# Patient Record
Sex: FEMALE | Race: WHITE | NOT HISPANIC OR LATINO | Employment: FULL TIME | URBAN - METROPOLITAN AREA
[De-identification: names, ages, dates, MRNs, and addresses within clinical notes are randomized per-mention and may not be internally consistent; named-entity substitution may affect disease eponyms.]

---

## 2017-05-26 ENCOUNTER — APPOINTMENT (OUTPATIENT)
Dept: WOMENS IMAGING | Facility: HOSPITAL | Age: 47
End: 2017-05-26

## 2017-05-26 PROCEDURE — 77063 BREAST TOMOSYNTHESIS BI: CPT | Performed by: RADIOLOGY

## 2017-05-26 PROCEDURE — 77067 SCR MAMMO BI INCL CAD: CPT | Performed by: RADIOLOGY

## 2017-06-02 ENCOUNTER — TRANSCRIBE ORDERS (OUTPATIENT)
Dept: ADMINISTRATIVE | Facility: HOSPITAL | Age: 47
End: 2017-06-02

## 2017-06-02 DIAGNOSIS — E66.9 OBESITY, UNSPECIFIED OBESITY SEVERITY, UNSPECIFIED OBESITY TYPE: Primary | ICD-10-CM

## 2017-06-02 DIAGNOSIS — E28.2 PCOS (POLYCYSTIC OVARIAN SYNDROME): ICD-10-CM

## 2018-06-07 ENCOUNTER — APPOINTMENT (OUTPATIENT)
Dept: WOMENS IMAGING | Facility: HOSPITAL | Age: 48
End: 2018-06-07

## 2018-06-07 PROCEDURE — 77067 SCR MAMMO BI INCL CAD: CPT | Performed by: RADIOLOGY

## 2018-06-07 PROCEDURE — 77063 BREAST TOMOSYNTHESIS BI: CPT | Performed by: RADIOLOGY

## 2018-06-08 ENCOUNTER — OFFICE VISIT (OUTPATIENT)
Dept: GASTROENTEROLOGY | Facility: CLINIC | Age: 48
End: 2018-06-08

## 2018-06-08 VITALS
DIASTOLIC BLOOD PRESSURE: 86 MMHG | TEMPERATURE: 98.7 F | HEIGHT: 67 IN | BODY MASS INDEX: 45.99 KG/M2 | SYSTOLIC BLOOD PRESSURE: 128 MMHG | WEIGHT: 293 LBS

## 2018-06-08 DIAGNOSIS — R13.14 PHARYNGOESOPHAGEAL DYSPHAGIA: Primary | ICD-10-CM

## 2018-06-08 DIAGNOSIS — E66.01 MORBID OBESITY WITH BMI OF 50.0-59.9, ADULT (HCC): ICD-10-CM

## 2018-06-08 PROCEDURE — 99204 OFFICE O/P NEW MOD 45 MIN: CPT | Performed by: INTERNAL MEDICINE

## 2018-06-08 RX ORDER — DICLOFENAC SODIUM AND MISOPROSTOL 50; 200 MG/1; UG/1
TABLET, DELAYED RELEASE ORAL
COMMUNITY

## 2018-06-08 RX ORDER — FUROSEMIDE 40 MG/1
TABLET ORAL
COMMUNITY
Start: 2018-05-30

## 2018-06-08 RX ORDER — LORATADINE 10 MG/1
10 TABLET ORAL
COMMUNITY

## 2018-06-08 NOTE — PROGRESS NOTES
Chief Complaint   Patient presents with   • Difficulty Swallowing       Subjective     HPI    Philippe Claros is a 47 y.o. female with a past medical history noted below who presents for evaluation of difficulty swallowing.  First episode occurred about 2 weeks ago.  She was eating salad and felt it get stuck at the left side of her throat.  Stuck for a few seconds.  She then coughed it up because she felt that she was going to choke.  This has persisted and she has felt daily episodes since then, despite chewing her food really well. Doing ok with liquids, soft foods.  Denies any associated heartburn, reflux, nausea, abdominal pain.  Has not had similar issues in the past.  Not on any ppi, antacid.      No smoking, social ETOH.  No family history of GI malignancy, no throat cancers.  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  No abdominal surgeries.    Has knee osteoarthritis and has gained weight due to symptoms.    Past Medical History:   Diagnosis Date   • Anxiety    • Osteoarthritis          Current Outpatient Prescriptions:   •  diclofenac-misoprostol (ARTHROTEC 50) 50-0.2 MG EC tablet, Take  by mouth., Disp: , Rfl:   •  furosemide (LASIX) 40 MG tablet, , Disp: , Rfl:   •  loratadine (CLARITIN) 10 MG tablet, Take 10 mg by mouth., Disp: , Rfl:   •  progesterone (PROMETRIUM) 100 MG capsule, Take 100 mg by mouth., Disp: , Rfl:     Allergies   Allergen Reactions   • Naproxen GI Intolerance   • Latex Rash       Social History     Social History   • Marital status: Single     Spouse name: N/A   • Number of children: N/A   • Years of education: N/A     Occupational History   • Not on file.     Social History Main Topics   • Smoking status: Former Smoker   • Smokeless tobacco: Never Used   • Alcohol use Yes      Comment: occ    • Drug use: No   • Sexual activity: Not on file     Other Topics Concern   • Not on file     Social History Narrative   • No narrative on file       History reviewed. No pertinent family  history.    Review of Systems   Constitutional: Negative for activity change, appetite change and fatigue.   HENT: Positive for trouble swallowing. Negative for sore throat.    Respiratory: Negative.    Cardiovascular: Negative.    Gastrointestinal: Negative for abdominal distention, abdominal pain and blood in stool.   Endocrine: Negative for cold intolerance and heat intolerance.   Genitourinary: Negative for difficulty urinating, dysuria and frequency.   Musculoskeletal: Positive for arthralgias. Negative for back pain and myalgias.   Skin: Negative.    Hematological: Negative for adenopathy. Does not bruise/bleed easily.   All other systems reviewed and are negative.      Objective     Vitals:    06/08/18 1425   BP: 128/86   Temp: 98.7 °F (37.1 °C)     1    06/08/18  1425   Weight: (!) 149 kg (327 lb 9.6 oz)     Body mass index is 51.31 kg/m².    Physical Exam   Constitutional: She is oriented to person, place, and time. She appears well-developed and well-nourished. No distress.   obese   HENT:   Head: Normocephalic and atraumatic.   Right Ear: External ear normal.   Left Ear: External ear normal.   Nose: Nose normal.   Mouth/Throat: Oropharynx is clear and moist.   Eyes: Conjunctivae and EOM are normal. Right eye exhibits no discharge. Left eye exhibits no discharge. No scleral icterus.   Neck: Normal range of motion. Neck supple. No tracheal deviation present. No thyromegaly present.   No supraclavicular adenopathy   Cardiovascular: Normal rate, regular rhythm, normal heart sounds and intact distal pulses.  Exam reveals no gallop.    No murmur heard.  No lower extremity edema   Pulmonary/Chest: Effort normal and breath sounds normal. No stridor. No respiratory distress. She has no wheezes.   Abdominal: Soft. Normal appearance and bowel sounds are normal. She exhibits no distension and no mass. There is no hepatosplenomegaly. There is no tenderness. There is no rigidity, no rebound and no guarding. No hernia.    Genitourinary:   Genitourinary Comments: Rectal exam deferred   Musculoskeletal: Normal range of motion. She exhibits no edema or tenderness.   No atrophy of upper or lower extremities.  Normal digits and nails of both hands.  Antalgic gait   Lymphadenopathy:     She has no cervical adenopathy.   Neurological: She is alert and oriented to person, place, and time. She displays no atrophy. Coordination normal.   Skin: Skin is warm and dry. No rash noted. She is not diaphoretic. No erythema.   Psychiatric: She has a normal mood and affect. Her behavior is normal. Judgment and thought content normal.   Vitals reviewed.      No results found for: WBC, RBC, HGB, HCT, MCV, MCH, MCHC, RDW, RDWSD, MPV, PLT, NEUTRORELPCT, LYMPHORELPCT, MONORELPCT, EOSRELPCT, BASORELPCT, AUTOIGPER, NEUTROABS, LYMPHSABS, MONOSABS, EOSABS, BASOSABS, AUTOIGNUM, NRBC    No results found for: GLUCOSE, NA, K, CO2, CL, ANIONGAP, CREATININE, BUN, BCR, CALCIUM, EGFRIFNONA, ALKPHOS, PROTEINTOT, ALT, AST, BILITOT, ALBUMIN, GLOB, LABIL2      Imaging Results (last 7 days)     ** No results found for the last 168 hours. **            No notes on file    Assessment/Plan    Dysphagia: Seems like more of a oropharyngeal versus upper pharyngoesophageal issue.  No evidence of any abnormality on my evaluation of her oropharynx    Morbid obesity: complicating her osteoarthritis    Plan  Esophagram for further evaluation  ? ppi or ENT or EGD based on results  To ER for any food sticking for greater than an hour  Counseled re: bariatric surgery given her morbid obesity    Philippe was seen today for difficulty swallowing.    Diagnoses and all orders for this visit:    Pharyngoesophageal dysphagia  -     FL Esophagram Complete; Future    Morbid obesity with BMI of 50.0-59.9, adult        I have discussed the above plan with the patient.  They verbalize understanding and are in agreement with the plan.  They have been advised to contact the office for any questions,  concerns, or changes related to their health.    Dictated utilizing Dragon dictation

## 2018-06-08 NOTE — PATIENT INSTRUCTIONS
Schedule the esophagram    For any additional questions, concerns or changes to your condition after today's office visit please contact the office at [phone].

## 2018-06-18 ENCOUNTER — HOSPITAL ENCOUNTER (OUTPATIENT)
Dept: GENERAL RADIOLOGY | Facility: HOSPITAL | Age: 48
Discharge: HOME | End: 2018-06-18
Attending: INTERNAL MEDICINE | Admitting: INTERNAL MEDICINE

## 2018-06-18 DIAGNOSIS — R13.14 PHARYNGOESOPHAGEAL DYSPHAGIA: ICD-10-CM

## 2018-06-18 PROCEDURE — 74220 X-RAY XM ESOPHAGUS 1CNTRST: CPT

## 2018-06-18 RX ADMIN — ANTACID/ANTIFLATULENT 1 TABLET: 380; 550; 10; 10 GRANULE, EFFERVESCENT ORAL at 09:47

## 2018-06-18 RX ADMIN — BARIUM SULFATE 135 ML: 980 POWDER, FOR SUSPENSION ORAL at 09:48

## 2018-06-18 RX ADMIN — BARIUM SULFATE 183 ML: 960 POWDER, FOR SUSPENSION ORAL at 09:48

## 2018-06-21 DIAGNOSIS — R13.12 OROPHARYNGEAL DYSPHAGIA: Primary | ICD-10-CM

## 2018-06-21 NOTE — PROGRESS NOTES
I called and spoke with the patient about her results.  Recommending speech therapy evaluation to assess her swallow.  She verbalizes understanding and is in agreement.  Referral made.

## 2018-10-05 DIAGNOSIS — R13.12 OROPHARYNGEAL DYSPHAGIA: Primary | ICD-10-CM

## 2019-12-04 ENCOUNTER — APPOINTMENT (OUTPATIENT)
Dept: WOMENS IMAGING | Facility: HOSPITAL | Age: 49
End: 2019-12-04

## 2019-12-04 PROCEDURE — 77067 SCR MAMMO BI INCL CAD: CPT | Performed by: RADIOLOGY

## 2019-12-04 PROCEDURE — 77063 BREAST TOMOSYNTHESIS BI: CPT | Performed by: RADIOLOGY

## 2020-12-08 ENCOUNTER — APPOINTMENT (OUTPATIENT)
Dept: WOMENS IMAGING | Facility: HOSPITAL | Age: 50
End: 2020-12-08

## 2020-12-08 PROCEDURE — 77063 BREAST TOMOSYNTHESIS BI: CPT | Performed by: RADIOLOGY

## 2020-12-08 PROCEDURE — 77067 SCR MAMMO BI INCL CAD: CPT | Performed by: RADIOLOGY

## 2022-03-02 ENCOUNTER — APPOINTMENT (OUTPATIENT)
Dept: WOMENS IMAGING | Facility: HOSPITAL | Age: 52
End: 2022-03-02

## 2022-03-02 PROCEDURE — 77063 BREAST TOMOSYNTHESIS BI: CPT | Performed by: RADIOLOGY

## 2022-03-02 PROCEDURE — 77067 SCR MAMMO BI INCL CAD: CPT | Performed by: RADIOLOGY

## 2024-01-16 ENCOUNTER — HOSPITAL ENCOUNTER (EMERGENCY)
Facility: HOSPITAL | Age: 54
Discharge: HOME | End: 2024-01-16
Attending: EMERGENCY MEDICINE | Admitting: EMERGENCY MEDICINE
Payer: COMMERCIAL

## 2024-01-16 ENCOUNTER — APPOINTMENT (OUTPATIENT)
Dept: GENERAL RADIOLOGY | Facility: HOSPITAL | Age: 54
End: 2024-01-16
Payer: COMMERCIAL

## 2024-01-16 VITALS
TEMPERATURE: 98 F | SYSTOLIC BLOOD PRESSURE: 125 MMHG | OXYGEN SATURATION: 100 % | DIASTOLIC BLOOD PRESSURE: 80 MMHG | HEART RATE: 70 BPM | RESPIRATION RATE: 20 BRPM

## 2024-01-16 DIAGNOSIS — R10.13 DYSPEPSIA: ICD-10-CM

## 2024-01-16 DIAGNOSIS — K21.9 GASTROESOPHAGEAL REFLUX DISEASE, UNSPECIFIED WHETHER ESOPHAGITIS PRESENT: Primary | ICD-10-CM

## 2024-01-16 LAB
ALBUMIN SERPL-MCNC: 4.7 G/DL (ref 3.5–5.2)
ALBUMIN/GLOB SERPL: 2.2 G/DL
ALP SERPL-CCNC: 111 U/L (ref 39–117)
ALT SERPL W P-5'-P-CCNC: 30 U/L (ref 1–33)
ANION GAP SERPL CALCULATED.3IONS-SCNC: 11 MMOL/L (ref 5–15)
AST SERPL-CCNC: 25 U/L (ref 1–32)
BASOPHILS # BLD AUTO: 0.02 10*3/MM3 (ref 0–0.2)
BASOPHILS NFR BLD AUTO: 0.3 % (ref 0–1.5)
BILIRUB SERPL-MCNC: 0.5 MG/DL (ref 0–1.2)
BILIRUB UR QL STRIP: NEGATIVE
BUN SERPL-MCNC: 11 MG/DL (ref 6–20)
BUN/CREAT SERPL: 12.6 (ref 7–25)
CALCIUM SPEC-SCNC: 9.6 MG/DL (ref 8.6–10.5)
CHLORIDE SERPL-SCNC: 107 MMOL/L (ref 98–107)
CLARITY UR: CLEAR
CO2 SERPL-SCNC: 23 MMOL/L (ref 22–29)
COLOR UR: YELLOW
CREAT SERPL-MCNC: 0.87 MG/DL (ref 0.57–1)
DEPRECATED RDW RBC AUTO: 44.5 FL (ref 37–54)
EGFRCR SERPLBLD CKD-EPI 2021: 79.3 ML/MIN/1.73
EOSINOPHIL # BLD AUTO: 0.06 10*3/MM3 (ref 0–0.4)
EOSINOPHIL NFR BLD AUTO: 1 % (ref 0.3–6.2)
ERYTHROCYTE [DISTWIDTH] IN BLOOD BY AUTOMATED COUNT: 13.8 % (ref 12.3–15.4)
GEN 5 2HR TROPONIN T REFLEX: 6 NG/L
GLOBULIN UR ELPH-MCNC: 2.1 GM/DL
GLUCOSE SERPL-MCNC: 104 MG/DL (ref 65–99)
GLUCOSE UR STRIP-MCNC: NEGATIVE MG/DL
HCT VFR BLD AUTO: 41.7 % (ref 34–46.6)
HGB BLD-MCNC: 13.5 G/DL (ref 12–15.9)
HGB UR QL STRIP.AUTO: NEGATIVE
KETONES UR QL STRIP: NEGATIVE
LEUKOCYTE ESTERASE UR QL STRIP.AUTO: NEGATIVE
LIPASE SERPL-CCNC: 31 U/L (ref 13–60)
LYMPHOCYTES # BLD AUTO: 1.42 10*3/MM3 (ref 0.7–3.1)
LYMPHOCYTES NFR BLD AUTO: 24.2 % (ref 19.6–45.3)
MCH RBC QN AUTO: 28.2 PG (ref 26.6–33)
MCHC RBC AUTO-ENTMCNC: 32.4 G/DL (ref 31.5–35.7)
MCV RBC AUTO: 87.2 FL (ref 79–97)
MONOCYTES # BLD AUTO: 0.5 10*3/MM3 (ref 0.1–0.9)
MONOCYTES NFR BLD AUTO: 8.5 % (ref 5–12)
NEUTROPHILS NFR BLD AUTO: 3.86 10*3/MM3 (ref 1.7–7)
NEUTROPHILS NFR BLD AUTO: 65.8 % (ref 42.7–76)
NITRITE UR QL STRIP: NEGATIVE
NT-PROBNP SERPL-MCNC: 109 PG/ML (ref 0–900)
PH UR STRIP.AUTO: 6 [PH] (ref 5–8)
PLATELET # BLD AUTO: 46 10*3/MM3 (ref 140–450)
PMV BLD AUTO: 11.1 FL (ref 6–12)
POTASSIUM SERPL-SCNC: 4.5 MMOL/L (ref 3.5–5.2)
PROT SERPL-MCNC: 6.8 G/DL (ref 6–8.5)
PROT UR QL STRIP: NEGATIVE
QT INTERVAL: 348 MS
QTC INTERVAL: 412 MS
RBC # BLD AUTO: 4.78 10*6/MM3 (ref 3.77–5.28)
SODIUM SERPL-SCNC: 141 MMOL/L (ref 136–145)
SP GR UR STRIP: 1.02 (ref 1–1.03)
TROPONIN T DELTA: -2 NG/L
TROPONIN T SERPL HS-MCNC: 8 NG/L
UROBILINOGEN UR QL STRIP: NORMAL
WBC NRBC COR # BLD AUTO: 5.87 10*3/MM3 (ref 3.4–10.8)

## 2024-01-16 PROCEDURE — 83880 ASSAY OF NATRIURETIC PEPTIDE: CPT | Performed by: EMERGENCY MEDICINE

## 2024-01-16 PROCEDURE — 36415 COLL VENOUS BLD VENIPUNCTURE: CPT

## 2024-01-16 PROCEDURE — 71045 X-RAY EXAM CHEST 1 VIEW: CPT

## 2024-01-16 PROCEDURE — 83690 ASSAY OF LIPASE: CPT | Performed by: EMERGENCY MEDICINE

## 2024-01-16 PROCEDURE — 80053 COMPREHEN METABOLIC PANEL: CPT | Performed by: EMERGENCY MEDICINE

## 2024-01-16 PROCEDURE — 99284 EMERGENCY DEPT VISIT MOD MDM: CPT

## 2024-01-16 PROCEDURE — 85025 COMPLETE CBC W/AUTO DIFF WBC: CPT | Performed by: EMERGENCY MEDICINE

## 2024-01-16 PROCEDURE — 93005 ELECTROCARDIOGRAM TRACING: CPT

## 2024-01-16 PROCEDURE — 81003 URINALYSIS AUTO W/O SCOPE: CPT | Performed by: EMERGENCY MEDICINE

## 2024-01-16 PROCEDURE — 84484 ASSAY OF TROPONIN QUANT: CPT | Performed by: EMERGENCY MEDICINE

## 2024-01-16 RX ORDER — SUCRALFATE 1 G/1
1 TABLET ORAL
Qty: 90 TABLET | Refills: 0 | Status: SHIPPED | OUTPATIENT
Start: 2024-01-16

## 2024-01-16 RX ORDER — SODIUM CHLORIDE 0.9 % (FLUSH) 0.9 %
10 SYRINGE (ML) INJECTION AS NEEDED
Status: DISCONTINUED | OUTPATIENT
Start: 2024-01-16 | End: 2024-01-16 | Stop reason: HOSPADM

## 2024-01-16 NOTE — ED PROVIDER NOTES
EMERGENCY DEPARTMENT ENCOUNTER  Room Number:  40/40  PCP: Abena Kendall MD  Independent Historians: Patient      HPI:  Chief Complaint: had concerns including Abdominal Pain and Chest Pain.     A complete HPI/ROS/PMH/PSH/SH/FH are unobtainable due to:       Context: Philippe Claros is a 53 y.o. female with a medical history of gastritis who presents to the ED c/o acute epigastric abdominal discomfort.  She reports that she thought it was just acid and she did take her home pantoprazole as well as some Mylanta which provided some relief but she has been belching a lot since then.  She denies any pain in her shoulders or neck.  She denies shortness of breath, vomiting, diaphoresis.  She reports the pain is in her upper abdomen in the middle but does radiate a little bit to the right side of her chest.  She does have a history of GERD and gastritis.  She is on pantoprazole but she has only been taking it daily for about the last month.  She denies any black tarry stool.      Review of prior external notes (non-ED) -and- Review of prior external test results outside of this encounter:  I reviewed GI progress note from 6/8/2018.  Patient was seen for dysphagia.        PAST MEDICAL HISTORY  Active Ambulatory Problems     Diagnosis Date Noted    Pharyngoesophageal dysphagia 06/08/2018     Resolved Ambulatory Problems     Diagnosis Date Noted    No Resolved Ambulatory Problems     Past Medical History:   Diagnosis Date    Anxiety     Osteoarthritis          PAST SURGICAL HISTORY  Past Surgical History:   Procedure Laterality Date    COLONOSCOPY  approx 2013    nml    CYST REMOVAL  1981    UPPER GASTROINTESTINAL ENDOSCOPY  approx 2013    gastritis.         FAMILY HISTORY  No family history on file.      SOCIAL HISTORY  Social History     Socioeconomic History    Marital status: Single   Tobacco Use    Smoking status: Former    Smokeless tobacco: Never   Substance and Sexual Activity    Alcohol use: Yes     Comment:  occ     Drug use: No         ALLERGIES  Naproxen and Latex      REVIEW OF SYSTEMS  Review of all 14 systems is negative other than stated in the HPI above.      PHYSICAL EXAM    I have reviewed the triage vital signs and nursing notes.    ED Triage Vitals   Temp Heart Rate Resp BP SpO2   01/16/24 1140 01/16/24 1140 01/16/24 1140 01/16/24 1201 01/16/24 1140   98 °F (36.7 °C) 98 20 125/80 98 %      Temp src Heart Rate Source Patient Position BP Location FiO2 (%)   01/16/24 1140 01/16/24 1140 -- -- --   Tympanic Monitor            GENERAL: alert, no acute distress, well-appearing  HENT: Normocephalic, atraumatic  EYES: no scleral icterus, EOMI  CV: regular rhythm, regular rate  RESPIRATORY: normal effort, lungs clear bilaterally  ABDOMEN: soft, no epigastric tenderness, no right upper quadrant tenderness.  Obese.  MUSCULOSKELETAL: no deformity  NEURO: alert, moves all extremities, follows commands, cranial nerves II through XII grossly intact, speech fluent and clear  PSYCH: calm, cooperative  SKIN: Warm, dry          LAB RESULTS  Recent Results (from the past 24 hour(s))   ECG 12 Lead Other; epigastric pain    Collection Time: 01/16/24 11:45 AM   Result Value Ref Range    QT Interval 348 ms    QTC Interval 412 ms   Comprehensive Metabolic Panel    Collection Time: 01/16/24 12:02 PM    Specimen: Blood   Result Value Ref Range    Glucose 104 (H) 65 - 99 mg/dL    BUN 11 6 - 20 mg/dL    Creatinine 0.87 0.57 - 1.00 mg/dL    Sodium 141 136 - 145 mmol/L    Potassium 4.5 3.5 - 5.2 mmol/L    Chloride 107 98 - 107 mmol/L    CO2 23.0 22.0 - 29.0 mmol/L    Calcium 9.6 8.6 - 10.5 mg/dL    Total Protein 6.8 6.0 - 8.5 g/dL    Albumin 4.7 3.5 - 5.2 g/dL    ALT (SGPT) 30 1 - 33 U/L    AST (SGOT) 25 1 - 32 U/L    Alkaline Phosphatase 111 39 - 117 U/L    Total Bilirubin 0.5 0.0 - 1.2 mg/dL    Globulin 2.1 gm/dL    A/G Ratio 2.2 g/dL    BUN/Creatinine Ratio 12.6 7.0 - 25.0    Anion Gap 11.0 5.0 - 15.0 mmol/L    eGFR 79.3 >60.0  mL/min/1.73   Lipase    Collection Time: 01/16/24 12:02 PM    Specimen: Blood   Result Value Ref Range    Lipase 31 13 - 60 U/L   CBC Auto Differential    Collection Time: 01/16/24 12:02 PM    Specimen: Blood   Result Value Ref Range    WBC 5.87 3.40 - 10.80 10*3/mm3    RBC 4.78 3.77 - 5.28 10*6/mm3    Hemoglobin 13.5 12.0 - 15.9 g/dL    Hematocrit 41.7 34.0 - 46.6 %    MCV 87.2 79.0 - 97.0 fL    MCH 28.2 26.6 - 33.0 pg    MCHC 32.4 31.5 - 35.7 g/dL    RDW 13.8 12.3 - 15.4 %    RDW-SD 44.5 37.0 - 54.0 fl    MPV 11.1 6.0 - 12.0 fL    Platelets 46 (C) 140 - 450 10*3/mm3    Neutrophil % 65.8 42.7 - 76.0 %    Lymphocyte % 24.2 19.6 - 45.3 %    Monocyte % 8.5 5.0 - 12.0 %    Eosinophil % 1.0 0.3 - 6.2 %    Basophil % 0.3 0.0 - 1.5 %    Neutrophils, Absolute 3.86 1.70 - 7.00 10*3/mm3    Lymphocytes, Absolute 1.42 0.70 - 3.10 10*3/mm3    Monocytes, Absolute 0.50 0.10 - 0.90 10*3/mm3    Eosinophils, Absolute 0.06 0.00 - 0.40 10*3/mm3    Basophils, Absolute 0.02 0.00 - 0.20 10*3/mm3   BNP    Collection Time: 01/16/24 12:02 PM    Specimen: Blood   Result Value Ref Range    proBNP 109.0 0.0 - 900.0 pg/mL   High Sensitivity Troponin T    Collection Time: 01/16/24 12:02 PM    Specimen: Blood   Result Value Ref Range    HS Troponin T 8 <14 ng/L       The above labs were ordered by me and independently reviewed by me.     RADIOLOGY  No Radiology Exams Resulted Within Past 24 Hours    The above radiology studies were ordered by me.  See ED course for independent interpretations.     MEDICATIONS GIVEN IN ER  Medications   sodium chloride 0.9 % flush 10 mL (has no administration in time range)         ORDERS PLACED DURING THIS VISIT:  Orders Placed This Encounter   Procedures    XR Chest 1 View    Comprehensive Metabolic Panel    Lipase    Urinalysis With Microscopic If Indicated (No Culture) - Urine, Clean Catch    CBC Auto Differential    BNP    High Sensitivity Troponin T    High Sensitivity Troponin T 2Hr    ECG 12 Lead Other;  epigastric pain    Insert Peripheral IV    CBC & Differential         OUTPATIENT MEDICATION MANAGEMENT:  Current Facility-Administered Medications Ordered in Epic   Medication Dose Route Frequency Provider Last Rate Last Admin    sodium chloride 0.9 % flush 10 mL  10 mL Intravenous PRN Ivan Noel MD         Current Outpatient Medications Ordered in Epic   Medication Sig Dispense Refill    diclofenac-misoprostol (ARTHROTEC 50) 50-0.2 MG EC tablet Take  by mouth.      furosemide (LASIX) 40 MG tablet       loratadine (CLARITIN) 10 MG tablet Take 10 mg by mouth.      progesterone (PROMETRIUM) 100 MG capsule Take 100 mg by mouth.           PROCEDURES  Procedures            PROGRESS, DATA ANALYSIS, CONSULTS, AND MEDICAL DECISION MAKING  All labs have been independently interpreted by me.  All radiology studies have been reviewed by me. All EKG's have been independently viewed and interpreted by me.  Discussion below represents my analysis of pertinent findings related to patient's condition, differential diagnosis, treatment plan and final disposition.    Differential diagnosis includes but is not limited to:  Gerd  Pancreatitis  Cholecystitis  Gastritis  ACS    Clinical Scores:                  ED Course as of 01/16/24 1709   Tue Jan 16, 2024   1210 EKG          EKG time: 1145  Rhythm/Rate: Sinus rhythm, 84  P waves and SC: Normal  QRS, axis: Normal axis  ST and T waves: No acute ischemic changes    Interpreted Contemporaneously by me, independently viewed         [JR]   1246 Platelets(!!): 46 [JR]   1246 WBC: 5.87  History of B-cell lymphoma [JR]   1246 Creatinine: 0.87 [JR]   1246 HS Troponin T: 8 [JR]   1246 Lipase: 31 [JR]   1517 HS Troponin T: 6 [JR]   1517 Troponin T Delta: -2 [JR]   1529 Chest x-ray independently interpreted in PACS and demonstrates no infiltrate, no evidence of hiatal hernia. [JR]   1547 Patient reassessed.  She remains asymptomatic.  I recommended continuing her pantoprazole but also  trying Carafate as needed.  She has an appointment with GI on February 1.  Return precautions were discussed. [JR]      ED Course User Index  [JR] Ivan Noel MD             AS OF 12:46 EST VITALS:    BP - 125/80  HR - 87  TEMP - 98 °F (36.7 °C) (Tympanic)  O2 SATS - 99%    COMPLEXITY OF CARE  Admission was considered but after careful review of the patient's presentation, physical examination, diagnostic results, and response to treatment the patient may be safely discharged with outpatient follow-up.      Chronic or social conditions impacting patient care (Social Determinants of Health):     DIAGNOSIS  Final diagnoses:   None           Prescription drug monitoring program review:         DISPOSITION  DISCHARGE    Patient discharged in stable condition.    Reviewed implications of results, diagnosis, meds, responsibility to follow up, warning signs and symptoms of possible worsening, potential complications and reasons to return to ER.    Patient/Family voiced understanding of above instructions.    Discussed plan for discharge, as there is no emergent indication for admission. Patient referred to primary care provider for BP management due to today's BP. Pt/family is agreeable and understands need for follow up and repeat testing.  Pt is aware that discharge does not mean that nothing is wrong but it indicates no emergency is present that requires admission and they must continue care with follow-up as given below or physician of their choice.     FOLLOW-UP  Abena Kendall MD  [address] [address] [phone]    Schedule an appointment as soon as possible for a visit            Medication List        New Prescriptions      sucralfate 1 g tablet  Commonly known as: CARAFATE  Take 1 tablet by mouth 3 (Three) Times a Day Before Meals.               Where to Get Your Medications        These medications were sent to [id number] [address] AT [facility] [phone] [facility] [phone] FX  [address], [facility] [address]      Phone: [phone]   sucralfate 1 g tablet             Please note that portions of this document were completed with a voice recognition program.    Note Disclaimer: At [facility], we believe that sharing information builds trust and better relationships. You are receiving this note because you recently visited [facility]. It is possible you will see health information before a provider has talked with you about it. This kind of information can be easy to misunderstand. To help you fully understand what it means for your health, we urge you to discuss this note with your provider.         Ivan Noel MD  01/16/24 8999